# Patient Record
Sex: FEMALE | Race: WHITE
[De-identification: names, ages, dates, MRNs, and addresses within clinical notes are randomized per-mention and may not be internally consistent; named-entity substitution may affect disease eponyms.]

---

## 2021-09-18 ENCOUNTER — HOSPITAL ENCOUNTER (EMERGENCY)
Dept: HOSPITAL 60 - LB.ED | Age: 66
Discharge: HOME | End: 2021-09-18
Payer: MEDICARE

## 2021-09-18 DIAGNOSIS — Z88.8: ICD-10-CM

## 2021-09-18 DIAGNOSIS — N30.00: Primary | ICD-10-CM

## 2021-09-18 NOTE — EDM.PDOC
ED HPI GENERAL MEDICAL PROBLEM





- General


Time Seen by Provider: 09/18/21 13:25





- History of Present Illness


INITIAL COMMENTS - FREE TEXT/NARRATIVE: 





C/O urinary frequency and dysuria for a couple days.


No fever or new back pain.


Hx of a UTI q 2-3 years.





- Related Data


                                    Allergies











Allergy/AdvReac Type Severity Reaction Status Date / Time


 


hydrocodone Allergy  Nausea and Verified 09/18/21 13:48





   Vomiting  











Home Meds: 


                                    Home Meds





NK [No Known Home Meds]  09/18/21 [History]











ED ROS GENERAL





- Review of Systems


Review Of Systems: Comprehensive ROS is negative, except as noted in HPI.


: Reports: Dysuria, Frequency





ED EXAM, RENAL/





- Physical Exam


Exam: See Below


 (Female) Exam: Deferred





Course





- Orders/Labs/Meds


Labs: 





                                Laboratory Tests











  09/18/21 Range/Units





  13:30 


 


Urine Color  Yellow  


 


Urine Appearance  Clear  (CLEAR)  


 


Urine pH  7.0  (5.0-8.0)  


 


Ur Specific Gravity  1.010  (1.003-1.030)  


 


Urine Protein  30 H  (NEGATIVE)  mg/dL


 


Urine Glucose (UA)  Negative  (NEGATIVE)  mg/dL


 


Urine Ketones  Negative  (NEGATIVE)  mg/dL


 


Urine Occult Blood  Small H  (NEGATIVE)  


 


Urine Nitrite  Negative  (NEGATIVE)  


 


Urine Bilirubin  Negative  (NEGATIVE)  


 


Urine Urobilinogen  0.2  (0.2-1.0)  E.U./dL


 


Ur Leukocyte Esterase  Small H  (NEGATIVE)  


 


Urine RBC  10-20 H  /HPF


 


Urine WBC  >100 H  /HPF


 


Ur Epithelial Cells  Few  /HPF


 


Urine Bacteria  Moderate H  /HPF














- Re-Assessments/Exams


Free Text/Narrative Re-Assessment/Exam: 





09/18/21 14:01


UA does show a UTI.


Bactrim DS will be started for 7 days.


Increase fluids for 2-3 days.


Re check in 2-3 days if not better.





Departure





- Departure


Time of Disposition: 13:55


Disposition: Home, Self-Care 01


Condition: Good


Clinical Impression: 


UTI (urinary tract infection)


Qualifiers:


 Urinary tract infection type: acute cystitis Hematuria presence: without 

hematuria Qualified Code(s): N30.00 - Acute cystitis without hematuria








- Discharge Information


Instructions:  Urinary Tract Infection, Adult


Additional Instructions: 


Increase fluids, Take Bactrim DS 800mg twice a day for 7 days. Return to clinic 

or ER if symptoms dont improve or get worse.